# Patient Record
Sex: MALE | Race: WHITE | NOT HISPANIC OR LATINO | Employment: OTHER | URBAN - NONMETROPOLITAN AREA
[De-identification: names, ages, dates, MRNs, and addresses within clinical notes are randomized per-mention and may not be internally consistent; named-entity substitution may affect disease eponyms.]

---

## 2020-02-19 ENCOUNTER — TELEPHONE (OUTPATIENT)
Dept: NEUROSURGERY | Facility: CLINIC | Age: 70
End: 2020-02-19

## 2020-02-19 NOTE — TELEPHONE ENCOUNTER
LM FOR PT ON BOTH NUMBERS TO CALL AND SCHEDULE NEW PT APPT. REFERRAL FROM [facility].     PT NEEDS ON A Monday OR Friday AFTER 10:00 AM.       REFERRAL AND RECORDS FAXED FROM JAHAIRA @ [facility] FOR SCHEDULING.

## 2020-04-02 ENCOUNTER — TELEPHONE (OUTPATIENT)
Dept: NEUROSURGERY | Facility: CLINIC | Age: 70
End: 2020-04-02

## 2020-04-02 NOTE — TELEPHONE ENCOUNTER
I called patient regarding his new patient appointment for 04/08/2020, he requested to cancel this appointment at this time, I offered to reschedule this but he chooses at this time to call us some time later in the summer, he has other health issues that have been put on hold due to the corona virus that will need to be taken care of first.  We will cancel per his request and wait for a call back from the patient.

## 2020-06-11 ENCOUNTER — TELEPHONE (OUTPATIENT)
Dept: NEUROSURGERY | Facility: CLINIC | Age: 70
End: 2020-06-11

## 2020-06-11 NOTE — TELEPHONE ENCOUNTER
Tried to reach patient to confirm his 06/15/2020 appointment, left message for a call back to confirm, to remind him to bring his MRI on a CD and also to explain screening process and visitors.

## 2020-06-15 ENCOUNTER — HOSPITAL ENCOUNTER (OUTPATIENT)
Dept: GENERAL RADIOLOGY | Facility: HOSPITAL | Age: 70
Discharge: HOME | End: 2020-06-15

## 2020-06-15 ENCOUNTER — HOSPITAL ENCOUNTER (OUTPATIENT)
Dept: GENERAL RADIOLOGY | Facility: HOSPITAL | Age: 70
Discharge: HOME | End: 2020-06-15
Admitting: NURSE PRACTITIONER

## 2020-06-15 ENCOUNTER — OFFICE VISIT (OUTPATIENT)
Dept: NEUROSURGERY | Facility: CLINIC | Age: 70
End: 2020-06-15

## 2020-06-15 VITALS
DIASTOLIC BLOOD PRESSURE: 64 MMHG | BODY MASS INDEX: 19.69 KG/M2 | SYSTOLIC BLOOD PRESSURE: 130 MMHG | WEIGHT: 140.6 LBS | HEIGHT: 71 IN

## 2020-06-15 DIAGNOSIS — M25.552 LEFT HIP PAIN: ICD-10-CM

## 2020-06-15 DIAGNOSIS — M41.20 SCOLIOSIS (AND KYPHOSCOLIOSIS), IDIOPATHIC: ICD-10-CM

## 2020-06-15 DIAGNOSIS — F17.200 SMOKER: ICD-10-CM

## 2020-06-15 DIAGNOSIS — M54.16 LUMBAR RADICULOPATHY: ICD-10-CM

## 2020-06-15 DIAGNOSIS — M51.37 DEGENERATION OF LUMBAR OR LUMBOSACRAL INTERVERTEBRAL DISC: ICD-10-CM

## 2020-06-15 DIAGNOSIS — M51.37 DEGENERATION OF LUMBAR OR LUMBOSACRAL INTERVERTEBRAL DISC: Primary | ICD-10-CM

## 2020-06-15 PROCEDURE — 72082 X-RAY EXAM ENTIRE SPI 2/3 VW: CPT

## 2020-06-15 PROCEDURE — 73502 X-RAY EXAM HIP UNI 2-3 VIEWS: CPT

## 2020-06-15 PROCEDURE — 99204 OFFICE O/P NEW MOD 45 MIN: CPT | Performed by: NURSE PRACTITIONER

## 2020-06-15 PROCEDURE — 72114 X-RAY EXAM L-S SPINE BENDING: CPT

## 2020-06-15 RX ORDER — BUDESONIDE AND FORMOTEROL FUMARATE DIHYDRATE 160; 4.5 UG/1; UG/1
AEROSOL RESPIRATORY (INHALATION) EVERY 12 HOURS SCHEDULED
COMMUNITY

## 2020-06-15 RX ORDER — HYDROCHLOROTHIAZIDE 25 MG/1
TABLET ORAL
COMMUNITY

## 2020-06-15 RX ORDER — FINASTERIDE 1 MG/1
1 TABLET, FILM COATED ORAL DAILY
COMMUNITY

## 2020-06-15 RX ORDER — VALSARTAN 80 MG/1
80 TABLET ORAL DAILY
COMMUNITY

## 2020-06-15 RX ORDER — FELODIPINE 5 MG/1
5 TABLET, EXTENDED RELEASE ORAL DAILY
COMMUNITY

## 2020-06-15 RX ORDER — CLOPIDOGREL BISULFATE 75 MG/1
TABLET ORAL
COMMUNITY

## 2020-06-15 NOTE — PROGRESS NOTES
Chief complaint:   Chief Complaint   Patient presents with   • Back Pain      has been referred here today with an MRI from [facility] for follow up for low back and left hip pain, that goes down his left leg.  He has not had any recent physical therapy for his back and hip pain.        Subjective     HPI: This is a 70-year-old male gentleman who was referred to us by CALIXTO Boyer for back pain.  He is here to be evaluated today.  The patient states that the pain is been going on for about 3 or 4 years.  There was no accident or injury associated with this.  The pain in his back is constant.  Is worse with walking and better with bending forward.  He has pain that radiates into his left lower extremity in a lateral radicular fashion to his knee.  This pain is intermittent.  Is worse with walking and standing and better with sitting but he says the pain in his leg is improved by 50% with sitting.  He says that the pain in his leg is the most severe pain that he is dealing with.  Denies any bowel or bladder incontinence.  He has not done any recent physical therapy, chiropractic care, or pain management injections.  Rates the pain on a scale 0-10 at a 6.  He says it does interfere with his actives of daily living.  He is right-hand dominant.  He also describes numbness and tingling in his bilateral lower extremities however he has just recently had vascular surgery and both of his legs.  Apparently the patient did not have any pulses in his feet and he does now have pulses.  He currently is not working.  He is .  He does smoke 1 pack of cigarettes a day.  Denies any alcohol or illicit drug use.  Medical problems include BPH, COPD, hypertension, peripheral vascular disease and hyperlipidemia.  The patient currently is taking Plavix    Review of Systems   Constitutional: Positive for activity change and fatigue.   HENT: Positive for hearing loss and tinnitus.    Respiratory: Positive for cough and  "shortness of breath.    Cardiovascular: Positive for leg swelling.   Genitourinary: Positive for difficulty urinating.   Musculoskeletal: Positive for back pain, gait problem and neck stiffness.   Neurological: Positive for weakness and numbness.   Hematological: Bruises/bleeds easily.   All other systems reviewed and are negative.       Past Medical History:   Diagnosis Date   • Cancer (CMS/HCC)     Prostrate   • Heart trouble      Past Surgical History:   Procedure Laterality Date   • VASCULAR SURGERY       Family History   Problem Relation Age of Onset   • Heart disease Mother    • Cancer Father    • Heart disease Father      Social History     Tobacco Use   • Smoking status: Current Some Day Smoker     Packs/day: 1.00     Types: Cigarettes   • Smokeless tobacco: Never Used   Substance Use Topics   • Alcohol use: Never     Frequency: Never   • Drug use: Never       (Not in a hospital admission)  Allergies:  Patient has no known allergies.    Objective      Vital Signs  /64 (BP Location: Right arm, Patient Position: Sitting)   Ht 180.3 cm (71\")   Wt 63.8 kg (140 lb 9.6 oz)   BMI 19.61 kg/m²     Physical Exam   Constitutional: He is oriented to person, place, and time. He appears well-developed and well-nourished.   HENT:   Head: Normocephalic.   Eyes: Pupils are equal, round, and reactive to light. Conjunctivae, EOM and lids are normal.   Neck: Normal range of motion.   Cardiovascular: Normal rate, regular rhythm and normal heart sounds.   Pulmonary/Chest: Effort normal and breath sounds normal.   Abdominal: Normal appearance.   Musculoskeletal: Normal range of motion.        Left hip: He exhibits tenderness.        Lumbar back: He exhibits pain.   Neurological: He is alert and oriented to person, place, and time. He has normal strength and normal reflexes. He displays normal reflexes. No cranial nerve deficit or sensory deficit. GCS eye subscore is 4. GCS verbal subscore is 5. GCS motor subscore is 6. "   Skin: Skin is warm.   Psychiatric: He has a normal mood and affect. His speech is normal and behavior is normal. Thought content normal. Cognition and memory are normal.       Neurologic Exam     Mental Status   Oriented to person, place, and time.   Speech: speech is normal     Cranial Nerves     CN III, IV, VI   Pupils are equal, round, and reactive to light.  Extraocular motions are normal.     Motor Exam     Strength   Strength 5/5 throughout.       Results Review: MRI of the lumbar spine shows patient does have significant disc degeneration at L1-2.  There is bilateral foraminal narrowing with the left being worse than the right.  At L2-3 there is bilateral foraminal narrowing.  At L5 3-4 lumbar stenosis is present with bilateral foraminal narrowing.  At L4-5 bilateral foraminal stenosis with the right being worse than the left.  At L5-S1 no significant area of stenosis.  There is also Modic endplate changes at L4-5.  The patient does have appear to have degenerative scoliosis as well.  No fracture visualized.  No cord signal change        Assessment/Plan: I am going to send the patient for set of x-rays of the lumbar spine to include standing flexion extension as well as scoliosis x-rays.  I am also going to examine his left hip as he does appear to be having a lot of pain around his hip joint and have an x-ray done.  We will also set him up to do physical therapy in [facility].  [facility] about getting [facility] up to do pain management as well.  We will follow-up with him in 2 to 3 months and have him see Dr. Jara for further evaluation and to see what the x-rays show in addition to seeing how his treatments are helping with the pain.  He was told to call us if he had any further problems.  Their questions and concerns were addressed.      Patient is a smoker.  Smoking cessation classes given to the patient  BMI shows the patient is Normal. BMI  chart was given to the patient.   Advance Care Planning   ACP discussion was held with the patient during this visit. Patient does not have an advance directive, information provided.      FRANCISCO was seen today for back pain.    Diagnoses and all orders for this visit:    Degeneration of lumbar or lumbosacral intervertebral disc  -     XR Spine Lumbar Complete With Flex & Ext; Future  -     Ambulatory Referral to Physical Therapy Evaluate and treat (2-3 days a week for 4-6 weekw)    Lumbar radiculopathy  -     XR Spine Lumbar Complete With Flex & Ext; Future  -     Ambulatory Referral to Physical Therapy Evaluate and treat (2-3 days a week for 4-6 weekw)    Left hip pain  -     XR Hip With or Without Pelvis 2 - 3 View Left; Future  -     Ambulatory Referral to Physical Therapy Evaluate and treat (2-3 days a week for 4-6 weekw)    Scoliosis (and kyphoscoliosis), idiopathic  -     XR Spine Lumbar Complete With Flex & Ext; Future  -     XR Scoliosis Complete Including Supine & Erect; Future  -     Ambulatory Referral to Physical Therapy Evaluate and treat (2-3 days a week for 4-6 weekw)    Smoker    Body mass index (BMI) 19.9 or less, adult          I discussed the patients findings and my recommendations with patient    Cr Almaraz, APRN  06/15/20  12:20

## 2020-09-09 ENCOUNTER — OFFICE VISIT (OUTPATIENT)
Dept: NEUROSURGERY | Facility: CLINIC | Age: 70
End: 2020-09-09

## 2020-09-09 VITALS
DIASTOLIC BLOOD PRESSURE: 68 MMHG | HEIGHT: 71 IN | WEIGHT: 139.6 LBS | BODY MASS INDEX: 19.54 KG/M2 | SYSTOLIC BLOOD PRESSURE: 128 MMHG

## 2020-09-09 DIAGNOSIS — F17.200 SMOKER: ICD-10-CM

## 2020-09-09 DIAGNOSIS — M54.16 LUMBAR RADICULOPATHY: ICD-10-CM

## 2020-09-09 DIAGNOSIS — M41.20 SCOLIOSIS (AND KYPHOSCOLIOSIS), IDIOPATHIC: ICD-10-CM

## 2020-09-09 DIAGNOSIS — M51.37 DEGENERATION OF LUMBAR OR LUMBOSACRAL INTERVERTEBRAL DISC: Primary | ICD-10-CM

## 2020-09-09 PROCEDURE — 99214 OFFICE O/P EST MOD 30 MIN: CPT | Performed by: NEUROLOGICAL SURGERY

## 2020-09-09 RX ORDER — ALBUTEROL SULFATE 90 UG/1
AEROSOL, METERED RESPIRATORY (INHALATION)
COMMUNITY

## 2020-09-09 RX ORDER — ALBUTEROL SULFATE 2.5 MG/3ML
SOLUTION RESPIRATORY (INHALATION)
COMMUNITY

## 2020-09-09 RX ORDER — TAMSULOSIN HYDROCHLORIDE 0.4 MG/1
CAPSULE ORAL EVERY 24 HOURS
COMMUNITY

## 2020-09-09 RX ORDER — CARVEDILOL 25 MG/1
25 TABLET ORAL 2 TIMES DAILY WITH MEALS
COMMUNITY

## 2020-09-09 RX ORDER — PANTOPRAZOLE SODIUM 40 MG/1
40 TABLET, DELAYED RELEASE ORAL DAILY
COMMUNITY

## 2020-09-09 RX ORDER — ATORVASTATIN CALCIUM 80 MG/1
80 TABLET, FILM COATED ORAL DAILY
COMMUNITY

## 2020-09-09 NOTE — PATIENT INSTRUCTIONS
"PATIENT TO CONTINUE TO FOLLOW UP WITH HIS PRIMARY CARE PROVIDER FOR YEARLY PHYSICAL EXAMS TO ENSURE COMPLETE HEALTH MAINTENANCE        BMI for Adults    Body mass index (BMI) is a number that is calculated from a person's weight and height. BMI may help to estimate how much of a person's weight is composed of fat. BMI can help identify those who may be at higher risk for certain medical problems.  How is BMI used with adults?  BMI is used as a screening tool to identify possible weight problems. It is used to check whether a person is obese, overweight, healthy weight, or underweight.  How is BMI calculated?  BMI measures your weight and compares it to your height. This can be done either in English (U[place] or metric measurements. Note that charts are available to help you find your BMI quickly and easily without having to do these calculations yourself.  To calculate your BMI in English (U.[place] measurements, your health care provider will:  1. Measure your weight in pounds (lb).  2. Multiply the number of pounds by 703.  ? For example, for a person who weighs 180 lb, multiply that number by 703, which equals 126,540.  3. Measure your height in inches (in). Then multiply that number by itself to get a measurement called \"inches squared.\"  ? For example, for a person who is 70 in tall, the \"inches squared\" measurement is 70 in x 70 in, which equals 4900 inches squared.  4. Divide the total from Step 2 (number of lb x 703) by the total from Step 3 (inches squared): 126,540 ÷ 4900 = 25.8. This is your BMI.  To calculate your BMI in metric measurements, your health care provider will:  1. Measure your weight in kilograms (kg).  2. Measure your height in meters (m). Then multiply that number by itself to get a measurement called \"meters squared.\"  ? For example, for a person who is 1.75 m tall, the \"meters squared\" measurement is 1.75 m x 1.75 m, which is equal to 3.1 meters squared.  3. Divide the number of kilograms " (your weight) by the meters squared number. In this example: 70 ÷ 3.1 = 22.6. This is your BMI.  How is BMI interpreted?  To interpret your results, your health care provider will use BMI charts to identify whether you are underweight, normal weight, overweight, or obese. The following guidelines will be used:  · Underweight: BMI less than 18.5.  · Normal weight: BMI between 18.5 and 24.9.  · Overweight: BMI between 25 and 29.9.  · Obese: BMI of 30 and above.  Please note:  · Weight includes both fat and muscle, so someone with a muscular build, such as an athlete, may have a BMI that is higher than 24.9. In cases like these, BMI is not an accurate measure of body fat.  · To determine if excess body fat is the cause of a BMI of 25 or higher, further assessments may need to be done by a health care provider.  · BMI is usually interpreted in the same way for men and women.  Why is BMI a useful tool?  BMI is useful in two ways:  · Identifying a weight problem that may be related to a medical condition, or that may increase the risk for medical problems.  · Promoting lifestyle and diet changes in order to reach a healthy weight.  Summary  · Body mass index (BMI) is a number that is calculated from a person's weight and height.  · BMI may help to estimate how much of a person's weight is composed of fat. BMI can help identify those who may be at higher risk for certain medical problems.  · BMI can be measured using English measurements or metric measurements.  · To interpret your results, your health care provider will use BMI charts to identify whether you are underweight, normal weight, overweight, or obese.  This information is not intended to replace advice given to you by your health care provider. Make sure you discuss any questions you have with your health care provider.  Document Released: 08/29/2005 Document Revised: 11/30/2018 Document Reviewed: 10/31/2018  Elsevier Patient Education © 2020 Elsevier  Inc.      Steps to Quit Smoking  Smoking tobacco is the leading cause of preventable death. It can affect almost every organ in the body. Smoking puts you and those around you at risk for developing many serious chronic diseases. Quitting smoking can be difficult, but it is one of the best things that you can do for your health. It is never too late to quit.  How do I get ready to quit?  When you decide to quit smoking, create a plan to help you succeed. Before you quit:  · Pick a date to quit. Set a date within the next 2 weeks to give you time to prepare.  · Write down the reasons why you are quitting. Keep this list in places where you will see it often.  · Tell your family, friends, and co-workers that you are quitting. Support from your loved ones can make quitting easier.  · Talk with your health care provider about your options for quitting smoking.  · Find out what treatment options are covered by your health insurance.  · Identify people, places, things, and activities that make you want to smoke (triggers). Avoid them.  What first steps can I take to quit smoking?  · Throw away all cigarettes at home, at work, and in your car.  · Throw away smoking accessories, such as ashtrays and lighters.  · Clean your car. Make sure to empty the ashtray.  · Clean your home, including curtains and carpets.  What strategies can I use to quit smoking?  Talk with your health care provider about combining strategies, such as taking medicines while you are also receiving in-person counseling. Using these two strategies together makes you more likely to succeed in quitting than if you used either strategy on its own.  · If you are pregnant or breastfeeding, talk with your health care provider about finding counseling or other support strategies to quit smoking. Do not take medicine to help you quit smoking unless your health care provider tells you to do so.  To quit smoking:  Quit right away  · Quit smoking completely,  instead of gradually reducing how much you smoke over a period of time. Research shows that stopping smoking right away is more successful than gradually quitting.  · Attend in-person counseling to help you build problem-solving skills. You are more likely to succeed in quitting if you attend counseling sessions regularly. Even short sessions of 10 minutes can be effective.  Take medicine  You may take medicines to help you quit smoking. Some medicines require a prescription and some you can purchase over-the-counter. Medicines may have nicotine in them to replace the nicotine in cigarettes. Medicines may:  · Help to stop cravings.  · Help to relieve withdrawal symptoms.  Your health care provider may recommend:  · Nicotine patches, gum, or lozenges.  · Nicotine inhalers or sprays.  · Non-nicotine medicine that is taken by mouth.  Find resources  Find resources and support systems that can help you to quit smoking and remain smoke-free after you quit. These resources are most helpful when you use them often. They include:  · Online chats with a counselor.  · Telephone quitlines.  · Printed self-help materials.  · Support groups or group counseling.  · Text messaging programs.  · Mobile phone apps or applications. Use apps that can help you stick to your quit plan by providing reminders, tips, and encouragement. There are many free apps for mobile devices as well as websites. Examples include Quit Guide from the CDC and smokefree.gov  What things can I do to make it easier to quit?    · Reach out to your family and friends for support and encouragement. Call telephone quitlines (1-018-QUIT-NOW), reach out to support groups, or work with a counselor for support.  · Ask people who smoke to avoid smoking around you.  · Avoid places that trigger you to smoke, such as bars, parties, or smoke-break areas at work.  · Spend time with people who do not smoke.  · Lessen the stress in your life. Stress can be a smoking trigger  for some people. To lessen stress, try:  ? Exercising regularly.  ? Doing deep-breathing exercises.  ? Doing yoga.  ? Meditating.  ? Performing a body scan. This involves closing your eyes, scanning your body from head to toe, and noticing which parts of your body are particularly tense. Try to relax the muscles in those areas.  How will I feel when I quit smoking?  Day 1 to 3 weeks  Within the first 24 hours of quitting smoking, you may start to feel withdrawal symptoms. These symptoms are usually most noticeable 2-3 days after quitting, but they usually do not last for more than 2-3 weeks. You may experience these symptoms:  · Mood swings.  · Restlessness, anxiety, or irritability.  · Trouble concentrating.  · Dizziness.  · Strong cravings for sugary foods and nicotine.  · Mild weight gain.  · Constipation.  · Nausea.  · Coughing or a sore throat.  · Changes in how the medicines that you take for unrelated issues work in your body.  · Depression.  · Trouble sleeping (insomnia).  Week 3 and afterward  After the first 2-3 weeks of quitting, you may start to notice more positive results, such as:  · Improved sense of smell and taste.  · Decreased coughing and sore throat.  · Slower heart rate.  · Lower blood pressure.  · Clearer skin.  · The ability to breathe more easily.  · Fewer sick days.  Quitting smoking can be very challenging. Do not get discouraged if you are not successful the first time. Some people need to make many attempts to quit before they achieve long-term success. Do your best to stick to your quit plan, and talk with your health care provider if you have any questions or concerns.  Summary  · Smoking tobacco is the leading cause of preventable death. Quitting smoking is one of the best things that you can do for your health.  · When you decide to quit smoking, create a plan to help you succeed.  · Quit smoking right away, not slowly over a period of time.  · When you start quitting, seek help from  your health care provider, family, or friends.  This information is not intended to replace advice given to you by your health care provider. Make sure you discuss any questions you have with your health care provider.  Document Released: 12/12/2002 Document Revised: 03/06/2020 Document Reviewed: 03/07/2020  Elsevier Patient Education © 2020 Elsevier Inc.

## 2020-09-09 NOTE — PROGRESS NOTES
SUBJECTIVE:  Patient ID: FRANCISCO Hall is a 70 y.o. male is here today for follow-up.    Chief Complaint: Back pain  Chief Complaint   Patient presents with   • BACK & LEG PAIN     patient has imaging from [facility]; states this pain has been going on for about 3-4 years but is steadily getting worse.  Patient had xrays @ [facility] and has completed 3 physical therapy sessions.  Patient states he returned to work and that is exercise in itself; however, he states after returning to work he had another heart attack and had to have another stent placed.       HPI  70-year-old gentleman followed for back pain and left lower extremity radicular pain.  We sent him for a dedicated course of physical therapy.  However since we have seen him last he suffered a myocardial infarction and had to have a cardiac stent placed.  He was able to participate in 4 therapy sessions he did not see any improvement.  Right now he does not use any medications to manage this.  He has not had no pain management injections.  He continues to work about 20 hours a week as a .    The following portions of the patient's history were reviewed and updated as appropriate: allergies, current medications, past family history, past medical history, past social history, past surgical history and problem list.    OBJECTIVE:    Review of Systems   Musculoskeletal: Positive for back pain.   All other systems reviewed and are negative.         Physical Exam   Constitutional: He is oriented to person, place, and time.   Eyes: Pupils are equal, round, and reactive to light. EOM are normal.   Neurological: He is oriented to person, place, and time. He has normal strength. He has a normal Finger-Nose-Finger Test. Gait normal.   Psychiatric: His speech is normal.       Neurologic Exam     Mental Status   Oriented to person, place, and time.   Attention: normal.   Speech: speech is normal   Level of consciousness: alert  Knowledge: good.      Cranial Nerves     CN II   Visual fields full to confrontation.     CN III, IV, VI   Pupils are equal, round, and reactive to light.  Extraocular motions are normal.     CN V   Facial sensation intact.     CN VII   Facial expression full, symmetric.     CN VIII   CN VIII normal.     CN IX, X   CN IX normal.   CN X normal.     CN XI   CN XI normal.     CN XII   CN XII normal.     Motor Exam   Muscle bulk: normal  Overall muscle tone: normal  Right arm pronator drift: absent  Left arm pronator drift: absent    Strength   Strength 5/5 throughout.     Sensory Exam   Light touch normal.   Pinprick normal.     Gait, Coordination, and Reflexes     Gait  Gait: normal    Coordination   Finger to nose coordination: normal    Tremor   Resting tremor: absent  Intention tremor: absent  Action tremor: absent    Reflexes   Reflexes 2+ except as noted.       Independent Review of Radiographic Studies:   MRI of the lumbar spine shows concentric lumbar stenosis at several levels of the low back.  The standing scoliosis films show both a sagittal imbalance and a coronal imbalance centered around L2-3.    ASSESSMENT/PLAN:  The patient complains of back pain and leg pain and neurogenic claudication.  At any consideration of surgery on his back would be an extensive deformity correction along with decompression.  At this point he is about 6 weeks out from an myocardial infarction which required new coronary stent.  He is on aspirin and Plavix and certainly cannot be taken off aspirin and Plavix for elective procedures.  I think pain management injections would be the logical next step in his care to try to get more aggressive control over his pain.  [facility] and we would be happy to see him in follow-up in about 4 to 6 months.      No diagnosis found.        No follow-ups on file.      James Jaar MD

## 2020-09-11 ENCOUNTER — TELEPHONE (OUTPATIENT)
Dept: NEUROSURGERY | Facility: CLINIC | Age: 70
End: 2020-09-11

## 2020-09-11 NOTE — TELEPHONE ENCOUNTER
I have contacted the patient's PCP office and spoke w/Zaire.    To ask them to make a referral to Dr Royce Singh for PMI due to patient needing an extensive surgery but just had a cardiac stenting so he cannot come off his blood thinners for a big surgery at this time.  Zaire has entered the information and is sending the request to Shay.  He states there is a referral in the chart already but is adding Dr Singh's name to the request.    jossy stinson CMA